# Patient Record
Sex: MALE | Race: WHITE | HISPANIC OR LATINO | Employment: FULL TIME | URBAN - METROPOLITAN AREA
[De-identification: names, ages, dates, MRNs, and addresses within clinical notes are randomized per-mention and may not be internally consistent; named-entity substitution may affect disease eponyms.]

---

## 2023-08-24 ENCOUNTER — OFFICE VISIT (OUTPATIENT)
Dept: INTERNAL MEDICINE | Facility: CLINIC | Age: 43
End: 2023-08-24
Payer: COMMERCIAL

## 2023-08-24 VITALS
SYSTOLIC BLOOD PRESSURE: 108 MMHG | WEIGHT: 160 LBS | OXYGEN SATURATION: 98 % | HEIGHT: 66 IN | HEART RATE: 64 BPM | BODY MASS INDEX: 25.71 KG/M2 | DIASTOLIC BLOOD PRESSURE: 66 MMHG

## 2023-08-24 DIAGNOSIS — Z12.11 SCREENING FOR COLON CANCER: Primary | ICD-10-CM

## 2023-08-24 DIAGNOSIS — Z00.00 ANNUAL PHYSICAL EXAM: ICD-10-CM

## 2023-08-24 DIAGNOSIS — R10.31 ABDOMINAL DISCOMFORT IN RIGHT LOWER QUADRANT: ICD-10-CM

## 2023-08-24 DIAGNOSIS — I10 HYPERTENSION, UNSPECIFIED TYPE: ICD-10-CM

## 2023-08-24 LAB
AMYLASE SERPL-CCNC: 56 U/L (ref 28–100)
ERYTHROCYTE [SEDIMENTATION RATE] IN BLOOD BY WESTERGREN METHOD: 1 MM/HR (ref 0–15)
LIPASE SERPL-CCNC: 34 U/L (ref 13–60)

## 2023-08-24 RX ORDER — HYDROCHLOROTHIAZIDE 25 MG/1
12.5 TABLET ORAL DAILY
Qty: 30 TABLET | Refills: 2 | Status: SHIPPED | OUTPATIENT
Start: 2023-08-24

## 2023-08-24 NOTE — PROGRESS NOTES
"Chief Complaint  Annual Exam    Subjective        Crispin Mendes presents to [facility]  History of Present Illness  43-year-old male presenting for annual exam.  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  He is considering changing jobs.  Is changed diet to include breakfast smoothies instead of coffee.  Has lost 5 or 6 pounds since starting.  Dad had diabetes.    Taking medications as prescribed.  Has occasional lightheadedness.  Denies headaches, chest pain, difficulty breathing, constipation, diarrhea.    Has had some right lower quadrant abdominal pain that feels like \"something is poking him\".  Happens occasionally.  No triggers or known. States maternal uncles  of types of stomach cancer.    Objective   Vital Signs:  /66   Pulse 64   Ht 167.6 cm (66\")   Wt 72.6 kg (160 lb)   SpO2 98%   BMI 25.82 kg/mý   Estimated body mass index is 25.82 kg/mý as calculated from the following:    Height as of this encounter: 167.6 cm (66\").    Weight as of this encounter: 72.6 kg (160 lb).       BMI is >= 25 and <30. (Overweight) The following options were offered after discussion;: exercise counseling/recommendations and nutrition counseling/recommendations      Physical Exam  Vitals reviewed.   Constitutional:       Appearance: Normal appearance.   HENT:      Head: Normocephalic.      Right Ear: Tympanic membrane normal.      Left Ear: Tympanic membrane normal.      Nose: Nose normal.      Mouth/Throat:      Mouth: Mucous membranes are moist.      Pharynx: Oropharynx is clear.   Eyes:      Pupils: Pupils are equal, round, and reactive to light.   Cardiovascular:      Rate and Rhythm: Normal rate.      Pulses: Normal pulses.      Heart sounds: Normal heart sounds.   Pulmonary:      Effort: Pulmonary effort is normal.      Breath sounds: Normal breath sounds.   Abdominal:      General: Bowel sounds are normal.      Palpations: Abdomen is soft.      " Tenderness: There is abdominal tenderness in the right lower quadrant. There is no guarding or rebound.      Hernia: No hernia is present.   Musculoskeletal:         General: Normal range of motion.      Cervical back: Normal range of motion.   Skin:     General: Skin is warm and dry.      Capillary Refill: Capillary refill takes less than 2 seconds.   Neurological:      General: No focal deficit present.      Mental Status: He is alert and oriented to person, place, and time.   Psychiatric:         Mood and Affect: Mood normal.         Behavior: Behavior normal.         Thought Content: Thought content normal.         Judgment: Judgment normal.      Result Review :    Common labs          5/24/2023    09:07   Common Labs   Glucose 93    BUN 14    Creatinine 0.89    Sodium 142    Potassium 4.8    Chloride 105    Calcium 9.5    Total Protein 7.2    Albumin 4.8    Total Bilirubin 0.5    Alkaline Phosphatase 45    AST (SGOT) 22    ALT (SGPT) 29    WBC 4.32    Hemoglobin 16.3    Hematocrit 46.5    Platelets 205    Total Cholesterol 191    Triglycerides 127    HDL Cholesterol 39    LDL Cholesterol  129        Current Outpatient Medications on File Prior to Visit   Medication Sig Dispense Refill    cetirizine (zyrTEC) 5 MG tablet Take 1 tablet by mouth Daily.      montelukast (SINGULAIR) 10 MG tablet Take 1 tablet by mouth Every Night.      omeprazole (priLOSEC) 20 MG capsule Take 1 capsule by mouth Daily.      [DISCONTINUED] hydroCHLOROthiazide (HYDRODIURIL) 25 MG tablet Take 1 tablet by mouth Daily. 30 tablet 2     No current facility-administered medications on file prior to visit.                Assessment and Plan   Diagnoses and all orders for this visit:    1. Screening for colon cancer (Primary)  -     Ambulatory Referral For Screening Colonoscopy    2. Hypertension, unspecified type  -     hydroCHLOROthiazide (HYDRODIURIL) 25 MG tablet; Take 0.5 tablets by mouth Daily.  Dispense: 30 tablet; Refill: 2    3.  Abdominal discomfort in right lower quadrant  -     Amylase  -     Lipase  -     Sedimentation rate, automated    4. Annual physical exam      Patient Instructions   Take hydrochlorothiazide 12.5 mg daily. Monitor blood pressure at home. Notify office if blood pressure starts to increase (140s/90s). Referral for colonoscopy placed. Scheduling center to call with appointment. Recommend Debrox over-the-counter ear drops for ear wax. Avoid Q-tips. Stay active. Stay hydrated with water. Labs today. Follow-up in 6 months for recheck.    The patient was counseled regarding nutrition, physical activity, healthy weight, injury prevention, misuse of tobacco, alcohol and illicit drugs, mental health, immunizations, and screenings.         Follow Up   Return in about 6 months (around 2/24/2024) for Recheck.  Patient was given instructions and counseling regarding his condition or for health maintenance advice. Please see specific information pulled into the AVS if appropriate.

## 2023-08-24 NOTE — PATIENT INSTRUCTIONS
Take hydrochlorothiazide 12.5 mg daily. Monitor blood pressure at home. Notify office if blood pressure starts to increase (140s/90s). Referral for colonoscopy placed. Scheduling center to call with appointment. Recommend Debrox over-the-counter ear drops for ear wax. Avoid Q-tips. Stay active. Stay hydrated with water. Labs today. Follow-up in 6 months for recheck.

## 2023-08-25 NOTE — PROGRESS NOTES
Pancreas enzymes are within normal limits.  No signs of inflammatory process either.  Continue to monitor abdominal discomfort.  Follow-up as needed.

## 2023-09-11 ENCOUNTER — TELEPHONE (OUTPATIENT)
Dept: GASTROENTEROLOGY | Facility: CLINIC | Age: 43
End: 2023-09-11
Payer: COMMERCIAL

## 2023-09-11 NOTE — TELEPHONE ENCOUNTER
Screening colonoscopy-- no personal or family hx of polyps or colon ca-- no ASA or blood thinners-- medications:    cetirizine (zyrTEC) 5 MG tablet    hydroCHLOROthiazide (HYDRODIURIL) 25 MG tablet      OA form scanned into media

## 2023-09-13 PROBLEM — Z12.11 SCREENING FOR MALIGNANT NEOPLASM OF COLON: Status: ACTIVE | Noted: 2023-09-13

## 2023-09-13 PROBLEM — Z80.0 FAMILY HISTORY OF COLON CANCER: Status: ACTIVE | Noted: 2023-09-13

## 2023-12-08 ENCOUNTER — TELEPHONE (OUTPATIENT)
Dept: SURGERY | Facility: CLINIC | Age: 43
End: 2023-12-08

## 2025-06-24 NOTE — PROGRESS NOTES
New Elbow      Patient: Crispin Mendes        YOB: 1980        Chief Complaints: Elbow pain left      History of Present Illness:  This is a very nice 45-year-old male who is right-hand-dominant injured his left elbow he was working and carrying a countertop his body let his indigo which hurt this patient's elbow he felt a pop in his elbow and has had pain has been able to work but not lift heavy countertops.  He speaks Nepali, his English is very good we do have an iPad  throughout this visit if needed      Allergies:   Allergies   Allergen Reactions    Sudafed [Pseudoephedrine] Other (See Comments)     One time reaction, not sure if it is a true allergy       Medications:   Home Medications:  Current Outpatient Medications on File Prior to Visit   Medication Sig    cetirizine (zyrTEC) 5 MG tablet Take 1 tablet by mouth Daily.    hydroCHLOROthiazide (HYDRODIURIL) 25 MG tablet Take 0.5 tablets by mouth Daily.    montelukast (SINGULAIR) 10 MG tablet Take 1 tablet by mouth Every Night.    omeprazole (priLOSEC) 20 MG capsule Take 1 capsule by mouth Daily.     No current facility-administered medications on file prior to visit.     Current Medications:  Scheduled Meds:  Continuous Infusions:No current facility-administered medications for this visit.    PRN Meds:.    No past medical history on file.   No past surgical history on file.     Social History     Occupational History    Not on file   Tobacco Use    Smoking status: Former     Types: Cigarettes     Passive exposure: Past    Smokeless tobacco: Never   Vaping Use    Vaping status: Never Used   Substance and Sexual Activity    Alcohol use: Yes     Comment: occasionally    Drug use: Never    Sexual activity: Yes     Partners: Female      Social History     Social History [facility]         Family History   Problem Relation Age of Onset    Diabetes Mother              Review of Systems:   Review of Systems      Physical Exam:  45 y.o. male  General Appearance:    Alert, cooperative, in no acute distress                 There were no vitals filed for this visit.   Patient is alert and read ×3 no acute distress appears her above-listed at height weight and age.  Affect is normal respiratory rate is normal unlabored. Heart rate regular rate rhythm, sclera, dentition and hearing are normal for the purpose of this exam.        Ortho Exam       Exam his left elbow he has palpable tenderness in the area of the biceps tendon I do feel some of the tendon is still heading towards the radius he has pain with resisted elbow flexion and resisted forearm supination    Radiology:   AP, Lateral of the  left elbow were ordered/reviewed to evaluate pain.  These are normal      Assessment/Plan:    Left elbow injury this is work comp he did get a supervisor on the phone his supervisor agreed and he will start the process for work comp on this injury as soon as they get that he will call me and we will submit a request for left elbow MRI

## 2025-07-03 ENCOUNTER — OFFICE VISIT (OUTPATIENT)
Dept: ORTHOPEDIC SURGERY | Facility: CLINIC | Age: 45
End: 2025-07-03
Payer: COMMERCIAL

## 2025-07-03 VITALS — BODY MASS INDEX: 27.97 KG/M2 | WEIGHT: 174 LBS | TEMPERATURE: 97.4 F | HEIGHT: 66 IN

## 2025-07-03 DIAGNOSIS — M25.522 LEFT ELBOW PAIN: Primary | ICD-10-CM

## 2025-07-22 ENCOUNTER — TELEPHONE (OUTPATIENT)
Dept: ORTHOPEDIC SURGERY | Facility: CLINIC | Age: 45
End: 2025-07-22
Payer: COMMERCIAL

## 2025-07-22 NOTE — TELEPHONE ENCOUNTER
Caller: JOSS HOBBS    Relationship: PT'S DAUGHTER    Best call back number: [phone]    What was the call regarding: PT'S DAUGHTER CALLING TO SEE IF PT'S EMPLOYER HAS CONTACTED DR [employer]TA [employer] TO CONFIRM WHETHER OR NOT PT'S SHOULDER INJURY IS RELATED TO A WORKER'S COMP CLAIM. PLEASE CONTACT PT'S DAUGHTER ABOVE TO CONFIRM.

## 2025-07-22 NOTE — TELEPHONE ENCOUNTER
Discussed injury was clearly a work comp injury he spoke to his employer when I saw him in the office we received anything from his employer or gotten a work comp number.  Can we research this a little bit for the patient

## 2025-07-22 NOTE — TELEPHONE ENCOUNTER
Returned call to patient's daughter.  They have not heard from his .   Gave her the claim number and phone number for [place].   Asked them to call and see if they can spark the  into authorizing his treatment.     [email or web address]

## 2025-08-21 DIAGNOSIS — S46.209A INJURY OF TENDON OF BICEPS: Primary | ICD-10-CM
